# Patient Record
Sex: MALE | Race: WHITE
[De-identification: names, ages, dates, MRNs, and addresses within clinical notes are randomized per-mention and may not be internally consistent; named-entity substitution may affect disease eponyms.]

---

## 2020-07-10 ENCOUNTER — HOSPITAL ENCOUNTER (OUTPATIENT)
Dept: HOSPITAL 53 - M LABSMTC | Age: 75
End: 2020-07-10
Attending: ANESTHESIOLOGY
Payer: MEDICARE

## 2020-07-10 DIAGNOSIS — Z01.818: Primary | ICD-10-CM

## 2020-07-10 DIAGNOSIS — Z11.59: ICD-10-CM

## 2020-07-13 ENCOUNTER — HOSPITAL ENCOUNTER (OUTPATIENT)
Dept: HOSPITAL 53 - M SDC | Age: 75
Discharge: HOME | End: 2020-07-13
Attending: OPHTHALMOLOGY
Payer: MEDICARE

## 2020-07-13 VITALS — DIASTOLIC BLOOD PRESSURE: 96 MMHG | SYSTOLIC BLOOD PRESSURE: 184 MMHG

## 2020-07-13 VITALS — HEIGHT: 68 IN | BODY MASS INDEX: 30.16 KG/M2 | WEIGHT: 199 LBS

## 2020-07-13 DIAGNOSIS — Z85.46: ICD-10-CM

## 2020-07-13 DIAGNOSIS — F32.9: ICD-10-CM

## 2020-07-13 DIAGNOSIS — H25.12: Primary | ICD-10-CM

## 2020-07-13 DIAGNOSIS — I10: ICD-10-CM

## 2020-07-13 DIAGNOSIS — E78.5: ICD-10-CM

## 2020-07-13 DIAGNOSIS — J44.9: ICD-10-CM

## 2020-07-13 DIAGNOSIS — Z79.899: ICD-10-CM

## 2020-07-13 DIAGNOSIS — Z87.891: ICD-10-CM

## 2020-07-13 DIAGNOSIS — Z92.21: ICD-10-CM

## 2020-07-13 DIAGNOSIS — Z99.81: ICD-10-CM

## 2020-07-13 PROCEDURE — 66984 XCAPSL CTRC RMVL W/O ECP: CPT

## 2020-07-13 PROCEDURE — 92015 DETERMINE REFRACTIVE STATE: CPT

## 2020-07-21 NOTE — RO
DATE OF PROCEDURE:  07/13/2020

 

PREPROCEDURE DIAGNOSIS:  Age-related nuclear cataract left eye.

 

POSTPROCEDURE DIAGNOSIS:  Age-related nuclear cataract left eye.

 

PROCEDURE PERFORMED:  Femtosecond cataract extraction with posterior chamber

intraocular lens implantation and Optiwave Refractive Analysis (ORA).  Lens used

was an [id number], 14.0 diopter.

 

SURGEON:  Altagracia Cazares MD

 

ASSISTANT:

 

ANESTHESIA:  Topical with sedation.

 

DESCRIPTION OF PROCEDURE:  Patient was prepped and draped in usual fashion.  A

lid speculum was placed between the lids.  The eye was fixated.  A stab incision

was made into the anterior chamber.  1% nonpreserved lidocaine was instilled.

Then viscoelastic was instilled.  The main incision was then opened with the

incision opener, and the anterior capsulorrhexis was removed, was performed by

the laser.  The lens was then rocked to remove any gas from behind it, and then

it was freely movable in the capsular bag.  The phacoemulsification unit was used

to groove the nucleus in two meridians.  The nucleus was cracked into four

quadrants.  Each quadrant was removed with the phacoemulsification unit.  Any

remaining cortex was removed with the irrigation and aspiration (I and A) unit.

 

The capsular bag was refilled with viscoelastic, and then the intraocular

pressure measured and found to be adequate for ORA.  The ORA was lowered into

place, focused on the apex of the cornea, and aligned to the patient.  Readings

were made, and an appropriate intraocular lens was chosen from the data generated

by the ORA.  The lens was then injected into the eye with its  and into

the capsular bag.   It was in great position.  Any remaining viscoelastic was

removed with the I and A unit.  The wound was then hydrated, and balanced salt

solution (BSS) and cefuroxime were instilled.

 

Patient tolerated this procedure well and went to recovery room in stable

condition.